# Patient Record
Sex: FEMALE | Race: WHITE | NOT HISPANIC OR LATINO | Employment: OTHER | URBAN - METROPOLITAN AREA
[De-identification: names, ages, dates, MRNs, and addresses within clinical notes are randomized per-mention and may not be internally consistent; named-entity substitution may affect disease eponyms.]

---

## 2022-06-13 ENCOUNTER — ANESTHESIA EVENT (OUTPATIENT)
Dept: PERIOP | Facility: HOSPITAL | Age: 77
End: 2022-06-13
Payer: MEDICARE

## 2022-06-13 RX ORDER — OLMESARTAN MEDOXOMIL 40 MG/1
40 TABLET ORAL DAILY
COMMUNITY

## 2022-06-13 RX ORDER — SULFAMETHOXAZOLE AND TRIMETHOPRIM 400; 80 MG/1; MG/1
1 TABLET ORAL DAILY
COMMUNITY
Start: 2022-04-26 | End: 2022-06-21

## 2022-06-13 RX ORDER — ROSUVASTATIN CALCIUM 5 MG/1
5 TABLET, COATED ORAL DAILY
COMMUNITY

## 2022-06-13 RX ORDER — FENOFIBRATE 54 MG/1
54 TABLET ORAL DAILY
COMMUNITY

## 2022-06-13 RX ORDER — METOPROLOL SUCCINATE 50 MG/1
50 TABLET, EXTENDED RELEASE ORAL DAILY
COMMUNITY
Start: 2022-05-02

## 2022-06-13 RX ORDER — DILTIAZEM HYDROCHLORIDE 240 MG/1
240 CAPSULE, EXTENDED RELEASE ORAL DAILY
COMMUNITY

## 2022-06-13 RX ORDER — CLONAZEPAM 0.12 MG/1
TABLET, ORALLY DISINTEGRATING ORAL
COMMUNITY
Start: 2022-04-08 | End: 2022-06-13

## 2022-06-13 RX ORDER — DABIGATRAN ETEXILATE 150 MG/1
150 CAPSULE, COATED PELLETS ORAL 2 TIMES DAILY
COMMUNITY

## 2022-06-13 RX ORDER — CONJUGATED ESTROGENS 0.62 MG/G
CREAM VAGINAL
COMMUNITY
Start: 2022-04-07

## 2022-06-13 NOTE — PRE-PROCEDURE INSTRUCTIONS
Pre-Surgery Instructions:   Medication Instructions    dabigatran etexilate (PRADAXA) 150 mg capsu Instructions provided by [facility] 48 hours prior surgery    diltiazem (DILACOR XR) 240 MG 24 hr capsule Take day of surgery   fenofibrate (TRICOR) 54 MG tablet Take day of surgery   metoprolol succinate (TOPROL-XL) 50 mg 24 hr tablet Take day of surgery   olmesartan (BENICAR) 40 mg tablet Hold day of surgery   Omega-3 Fatty Acids (FISH OIL PO) Stop taking 7 days prior to surgery   rosuvastatin (CRESTOR) 5 mg tablet Take night before surgery    VITAMIN D PO Stop taking 7 days prior to surgery  Have you had / have a sore throat? No  Have you had / have a cough less than 1 week? No  Have you had / have a fever greater than 100 0 - 100  4? No  Are you experiencing any shortness of breath? No    Review with patient via phone medications and showering instructions  Instructed to avoid all ASA and OTC Vit/Supp 1 week prior to surgery and to avoid NSAIDs 3 days prior to surgery per anesthesia instructions  Tylenol ok to take prn  [facility] call with surgery schedule time, nothing eat or drink after midnight  Verbalized understanding

## 2022-06-21 ENCOUNTER — HOSPITAL ENCOUNTER (OUTPATIENT)
Facility: HOSPITAL | Age: 77
Setting detail: OUTPATIENT SURGERY
Discharge: HOME | End: 2022-06-21
Attending: OBSTETRICS & GYNECOLOGY | Admitting: OBSTETRICS & GYNECOLOGY
Payer: MEDICARE

## 2022-06-21 ENCOUNTER — ANESTHESIA (OUTPATIENT)
Dept: PERIOP | Facility: HOSPITAL | Age: 77
End: 2022-06-21
Payer: MEDICARE

## 2022-06-21 VITALS
HEIGHT: 66 IN | BODY MASS INDEX: 27.21 KG/M2 | TEMPERATURE: 97.6 F | SYSTOLIC BLOOD PRESSURE: 176 MMHG | WEIGHT: 169.31 LBS | DIASTOLIC BLOOD PRESSURE: 90 MMHG | HEART RATE: 60 BPM | OXYGEN SATURATION: 95 % | RESPIRATION RATE: 16 BRPM

## 2022-06-21 DIAGNOSIS — N81.89 OTHER FEMALE GENITAL PROLAPSE: ICD-10-CM

## 2022-06-21 DIAGNOSIS — N36.41 HYPERMOBILITY OF URETHRA: ICD-10-CM

## 2022-06-21 DIAGNOSIS — N81.6 RECTOCELE: ICD-10-CM

## 2022-06-21 DIAGNOSIS — N81.11 CYSTOCELE, MIDLINE: ICD-10-CM

## 2022-06-21 DIAGNOSIS — N81.2 INCOMPLETE UTEROVAGINAL PROLAPSE: ICD-10-CM

## 2022-06-21 DIAGNOSIS — N39.3 SUI (STRESS URINARY INCONTINENCE, FEMALE): Primary | ICD-10-CM

## 2022-06-21 DIAGNOSIS — N81.84 PELVIC MUSCLE WASTING: ICD-10-CM

## 2022-06-21 PROCEDURE — 87086 URINE CULTURE/COLONY COUNT: CPT | Performed by: OBSTETRICS & GYNECOLOGY

## 2022-06-21 PROCEDURE — 87186 SC STD MICRODIL/AGAR DIL: CPT | Performed by: OBSTETRICS & GYNECOLOGY

## 2022-06-21 PROCEDURE — C1771 REP DEV, URINARY, W/SLING: HCPCS | Performed by: OBSTETRICS & GYNECOLOGY

## 2022-06-21 DEVICE — SINGLE INCISION SLING SYSTEM
Type: IMPLANTABLE DEVICE | Site: VAGINA | Status: FUNCTIONAL
Brand: ALTIS

## 2022-06-21 DEVICE — THE NEUGUIDE™ IS A SINGLE USE TRANS-VAGINAL DEVICE FOR THE REPAIR OF PELVIC ORGAN PROLAPSE (POP) BY ANCHORING SUTURES TO LIGAMENTS OF THE PELVIC FLOOR.
Type: IMPLANTABLE DEVICE | Site: VAGINA | Status: FUNCTIONAL
Brand: NEUGUIDE™

## 2022-06-21 RX ORDER — FENTANYL CITRATE/PF 50 MCG/ML
25 SYRINGE (ML) INJECTION
Status: DISCONTINUED | OUTPATIENT
Start: 2022-06-21 | End: 2022-06-21 | Stop reason: HOSPADM

## 2022-06-21 RX ORDER — SULFAMETHOXAZOLE AND TRIMETHOPRIM 800; 160 MG/1; MG/1
1 TABLET ORAL EVERY 12 HOURS SCHEDULED
Qty: 6 TABLET | Refills: 0 | Status: SHIPPED | OUTPATIENT
Start: 2022-06-21 | End: 2022-06-22 | Stop reason: SDUPTHER

## 2022-06-21 RX ORDER — PROPOFOL 10 MG/ML
INJECTION, EMULSION INTRAVENOUS AS NEEDED
Status: DISCONTINUED | OUTPATIENT
Start: 2022-06-21 | End: 2022-06-21

## 2022-06-21 RX ORDER — ACETAMINOPHEN 325 MG/1
975 TABLET ORAL EVERY 6 HOURS SCHEDULED
Status: DISCONTINUED | OUTPATIENT
Start: 2022-06-21 | End: 2022-06-21 | Stop reason: HOSPADM

## 2022-06-21 RX ORDER — METOPROLOL TARTRATE 5 MG/5ML
2.5 INJECTION INTRAVENOUS ONCE
Status: COMPLETED | OUTPATIENT
Start: 2022-06-21 | End: 2022-06-21

## 2022-06-21 RX ORDER — ACETAMINOPHEN 325 MG/1
975 TABLET ORAL ONCE
Status: COMPLETED | OUTPATIENT
Start: 2022-06-21 | End: 2022-06-21

## 2022-06-21 RX ORDER — SENNOSIDES 8.6 MG
650 CAPSULE ORAL EVERY 6 HOURS PRN
Qty: 30 TABLET | Refills: 0
Start: 2022-06-21

## 2022-06-21 RX ORDER — OXYCODONE HYDROCHLORIDE 5 MG/1
5 TABLET ORAL EVERY 4 HOURS PRN
Status: DISCONTINUED | OUTPATIENT
Start: 2022-06-21 | End: 2022-06-21 | Stop reason: HOSPADM

## 2022-06-21 RX ORDER — MAGNESIUM HYDROXIDE 1200 MG/15ML
LIQUID ORAL AS NEEDED
Status: DISCONTINUED | OUTPATIENT
Start: 2022-06-21 | End: 2022-06-21 | Stop reason: HOSPADM

## 2022-06-21 RX ORDER — FUROSEMIDE 10 MG/ML
INJECTION INTRAMUSCULAR; INTRAVENOUS AS NEEDED
Status: DISCONTINUED | OUTPATIENT
Start: 2022-06-21 | End: 2022-06-21

## 2022-06-21 RX ORDER — DEXAMETHASONE SODIUM PHOSPHATE 4 MG/ML
INJECTION, SOLUTION INTRA-ARTICULAR; INTRALESIONAL; INTRAMUSCULAR; INTRAVENOUS; SOFT TISSUE AS NEEDED
Status: DISCONTINUED | OUTPATIENT
Start: 2022-06-21 | End: 2022-06-21

## 2022-06-21 RX ORDER — ACETAMINOPHEN 325 MG/1
975 TABLET ORAL EVERY 6 HOURS SCHEDULED
Status: DISCONTINUED | OUTPATIENT
Start: 2022-06-21 | End: 2022-06-21

## 2022-06-21 RX ORDER — CEFAZOLIN SODIUM 2 G/50ML
2000 SOLUTION INTRAVENOUS ONCE
Status: DISCONTINUED | OUTPATIENT
Start: 2022-06-21 | End: 2022-06-21 | Stop reason: HOSPADM

## 2022-06-21 RX ORDER — GABAPENTIN 400 MG/1
400 CAPSULE ORAL ONCE
Status: COMPLETED | OUTPATIENT
Start: 2022-06-21 | End: 2022-06-21

## 2022-06-21 RX ORDER — HEPARIN SODIUM 5000 [USP'U]/ML
5000 INJECTION, SOLUTION INTRAVENOUS; SUBCUTANEOUS ONCE
Status: COMPLETED | OUTPATIENT
Start: 2022-06-21 | End: 2022-06-21

## 2022-06-21 RX ORDER — SODIUM CHLORIDE 9 MG/ML
125 INJECTION, SOLUTION INTRAVENOUS CONTINUOUS
Status: DISCONTINUED | OUTPATIENT
Start: 2022-06-21 | End: 2022-06-21 | Stop reason: HOSPADM

## 2022-06-21 RX ORDER — KETAMINE HYDROCHLORIDE 50 MG/ML
INJECTION, SOLUTION, CONCENTRATE INTRAMUSCULAR; INTRAVENOUS AS NEEDED
Status: DISCONTINUED | OUTPATIENT
Start: 2022-06-21 | End: 2022-06-21

## 2022-06-21 RX ORDER — ONDANSETRON 2 MG/ML
4 INJECTION INTRAMUSCULAR; INTRAVENOUS EVERY 6 HOURS PRN
Status: DISCONTINUED | OUTPATIENT
Start: 2022-06-21 | End: 2022-06-21 | Stop reason: HOSPADM

## 2022-06-21 RX ORDER — ONDANSETRON 2 MG/ML
INJECTION INTRAMUSCULAR; INTRAVENOUS AS NEEDED
Status: DISCONTINUED | OUTPATIENT
Start: 2022-06-21 | End: 2022-06-21

## 2022-06-21 RX ORDER — MIDAZOLAM HYDROCHLORIDE 2 MG/2ML
INJECTION, SOLUTION INTRAMUSCULAR; INTRAVENOUS AS NEEDED
Status: DISCONTINUED | OUTPATIENT
Start: 2022-06-21 | End: 2022-06-21

## 2022-06-21 RX ORDER — FENTANYL CITRATE 50 UG/ML
INJECTION, SOLUTION INTRAMUSCULAR; INTRAVENOUS AS NEEDED
Status: DISCONTINUED | OUTPATIENT
Start: 2022-06-21 | End: 2022-06-21

## 2022-06-21 RX ORDER — ONDANSETRON 2 MG/ML
4 INJECTION INTRAMUSCULAR; INTRAVENOUS ONCE AS NEEDED
Status: DISCONTINUED | OUTPATIENT
Start: 2022-06-21 | End: 2022-06-21 | Stop reason: HOSPADM

## 2022-06-21 RX ORDER — CEFAZOLIN SODIUM 2 G/50ML
SOLUTION INTRAVENOUS AS NEEDED
Status: DISCONTINUED | OUTPATIENT
Start: 2022-06-21 | End: 2022-06-21

## 2022-06-21 RX ORDER — DOCUSATE SODIUM 100 MG/1
100 CAPSULE, LIQUID FILLED ORAL 2 TIMES DAILY
Refills: 0
Start: 2022-06-21

## 2022-06-21 RX ORDER — PROPOFOL 10 MG/ML
INJECTION, EMULSION INTRAVENOUS CONTINUOUS PRN
Status: DISCONTINUED | OUTPATIENT
Start: 2022-06-21 | End: 2022-06-21

## 2022-06-21 RX ADMIN — ONDANSETRON 4 MG: 2 INJECTION INTRAMUSCULAR; INTRAVENOUS at 13:23

## 2022-06-21 RX ADMIN — MIDAZOLAM 2 MG: 1 INJECTION INTRAMUSCULAR; INTRAVENOUS at 12:35

## 2022-06-21 RX ADMIN — ACETAMINOPHEN 325MG 975 MG: 325 TABLET ORAL at 11:13

## 2022-06-21 RX ADMIN — SODIUM CHLORIDE 125 ML/HR: 0.9 INJECTION, SOLUTION INTRAVENOUS at 11:23

## 2022-06-21 RX ADMIN — PROPOFOL 50 MCG/KG/MIN: 10 INJECTION, EMULSION INTRAVENOUS at 12:43

## 2022-06-21 RX ADMIN — METOROPROLOL TARTRATE 2.5 MG: 5 INJECTION, SOLUTION INTRAVENOUS at 11:21

## 2022-06-21 RX ADMIN — GABAPENTIN 400 MG: 400 CAPSULE ORAL at 11:13

## 2022-06-21 RX ADMIN — DEXAMETHASONE SODIUM PHOSPHATE 4 MG: 4 INJECTION INTRA-ARTICULAR; INTRALESIONAL; INTRAMUSCULAR; INTRAVENOUS; SOFT TISSUE at 13:23

## 2022-06-21 RX ADMIN — HEPARIN SODIUM 5000 UNITS: 5000 INJECTION INTRAVENOUS; SUBCUTANEOUS at 12:03

## 2022-06-21 RX ADMIN — FUROSEMIDE 10 MG: 10 INJECTION, SOLUTION INTRAVENOUS at 14:19

## 2022-06-21 RX ADMIN — FENTANYL CITRATE 50 MCG: 50 INJECTION INTRAMUSCULAR; INTRAVENOUS at 13:12

## 2022-06-21 RX ADMIN — FENTANYL CITRATE 50 MCG: 50 INJECTION INTRAMUSCULAR; INTRAVENOUS at 12:53

## 2022-06-21 RX ADMIN — CEFAZOLIN SODIUM 2000 MG: 2 SOLUTION INTRAVENOUS at 12:40

## 2022-06-21 RX ADMIN — PROPOFOL 50 MG: 10 INJECTION, EMULSION INTRAVENOUS at 13:12

## 2022-06-21 RX ADMIN — KETAMINE HYDROCHLORIDE 20 MG: 50 INJECTION INTRAMUSCULAR; INTRAVENOUS at 12:43

## 2022-06-21 NOTE — ANESTHESIA POSTPROCEDURE EVALUATION
Post-Op Assessment Note    CV Status:  Stable    Pain management: adequate     Mental Status:  Alert and awake   Hydration Status:  Euvolemic   PONV Controlled:  Controlled   Airway Patency:  Patent      Post Op Vitals Reviewed: Yes      Staff: Anesthesiologist         No complications documented      /88 (06/21/22 1531)    Temp      Pulse 60 (06/21/22 1531)   Resp 12 (06/21/22 1531)    SpO2 96 % (06/21/22 1531)

## 2022-06-21 NOTE — DISCHARGE INSTRUCTIONS
Apical Vaginal Repair (EnPlace System)    ** YOU HAVE VAGINAL PACKING THAT YOU WILL NEED TO REMOVE Friday MORNING   WHAT YOU NEED TO KNOW:   An apical vaginal repair is a procedure to lift the cervix, vagina  and surrounding structures to the normal anatomical position  This can help prevent you from having a bulge sensation in the vagina  The procedure is also called an EnPlace procedure  DISCHARGE INSTRUCTIONS:   Medicines:   Pain medicine: You may need medicine to take away or decrease pain  Learn how to take your medicine  Ask what medicine and how much you should take  Be sure you know how, when, and how often to take it  Do not wait until the pain is severe before you take your medicine  Tell caregivers if your pain does not decrease  Pain medicine can make you dizzy or sleepy  Prevent falls by calling someone when you get out of bed or if you need help  Antibiotics: This medicine is given to fight or prevent an infection caused by bacteria  Always take your antibiotics exactly as ordered by your healthcare provider  Do not stop taking your medicine unless directed by your healthcare provider  Never save antibiotics or take leftover antibiotics that were given to you for another illness  Take your medicine as directed  Contact your healthcare provider if you think your medicine is not helping or if you have side effects  Tell him or her if you are allergic to any medicine  Keep a list of the medicines, vitamins, and herbs you take  Include the amounts, and when and why you take them  Bring the list or the pill bottles to follow-up visits  Carry your medicine list with you in case of an emergency  Follow up with your healthcare provider as directed:  Write down your questions so you remember to ask them during your visits  Self-care:   Sex:  Do not have sex until your healthcare provider says it is okay  Kegel exercises:   To do kegel exercises, squeeze your pelvic floor muscles for 5 to 10 seconds, then release  Regular kegel exercises will help your pelvic floor muscles become stronger  This will help prevent you from leaking urine  Ask your healthcare provider when to start these exercises and how often to do them  Sanitary pad:  Change your sanitary pad regularly  Keep track of how often you change the pad  Story catheter:  Keep the bag below your waist  This will help prevent infection and other problems caused by urine flowing back into your bladder  Do not pull on the catheter because this can cause pain and bleeding, and the catheter could come out  Keep the catheter tubing free of kinks so your urine will flow into the bag  Your healthcare provider will remove the catheter as soon as possible, to help prevent infection  Wound care:  When you are allowed to bathe or shower, carefully wash your vaginal area with soap and water  Do not put pressure on your abdomen: This will help prevent damage to your surgery area  Do not strain, lift heavy objects, or stand for a very long time  Do not perform strenuous exercises, such as running and weight lifting  Activity:  You may need to start walking within a few days after your procedure  Ask your healthcare provider when to start and how long you should walk  Ask about any other exercises that may be right for you  Support socks: You may need to wear support socks  These are tight socks that help increase the circulation in your legs until you are more active  This helps prevent blood clots  Contact your healthcare provider if:   You soak a sanitary pad with blood every hour for 4 hours  You have vaginal pain that does not go away even after you take pain medicine  You have pus or a foul-smelling discharge from your genital area  You see blood in your urine  You have pain during sex  You have a fever, chills, a cough, or feel weak and achy  You have nausea and vomiting      You have questions or concerns about your condition or care  Seek care immediately or call 911 if: You feel something is bulging out into your vagina or rectum and not going back in  You cannot urinate  Your arm or leg feels warm, tender, and painful  It may look swollen and red  You suddenly feel lightheaded and short of breath  You have chest pain  You may have more pain when you take a deep breath or cough  You may cough up blood  © 2017 [facility] Information is for End User's use only and may not be sold, redistributed or otherwise used for commercial purposes  All illustrations and images included in CareNotes® are the copyrighted property of A D A M [employer] Inc  or Hadley Josafat  The above information is an  only  It is not intended as medical advice for individual conditions or treatments  Talk to your doctor, nurse or pharmacist before following any medical regimen to see if it is safe and effective for you  Urethral Sling Procedure   WHAT YOU NEED TO KNOW:   A bladder sling procedure is surgery to treat urinary incontinence in women  The sling acts as a hammock to keep your urethra in place and hold it closed when your bladder is full  You may have vaginal bleeding or discharge for up to a week after your surgery  Use sanitary pads  Do not use tampons  You may have some pelvic discomfort or trouble urinating  DISCHARGE INSTRUCTIONS:   Call 911 for any of the following: You have sudden trouble breathing  Seek care immediately if:   Your bleeding gets worse  You have yellow or foul smelling discharge from your vagina  You cannot urinate, or you are urinating less than what is normal for you  You feel confused  Contact your healthcare provider if:   You have a fever  You do not feel like you are able to empty your bladder completely when you urinate  You feel the need to urinate very suddenly  You have burning or stinging when you urinate      You have blood in your urine  Your skin is itchy, swollen, or you have a rash  You have questions or concerns about your condition or care  Medicines:   Prescription pain medicine  may be given  Ask your how to take this medicine safely  Take your medicine as directed  Contact your healthcare provider if you think your medicine is not helping or if you have side effects  Tell him or her if you are allergic to any medicine  Keep a list of the medicines, vitamins, and herbs you take  Include the amounts, and when and why you take them  Bring the list or the pill bottles to follow-up visits  Carry your medicine list with you in case of an emergency  Self-catheterization:  You may need to put a catheter into your bladder after you urinate to empty any remaining urine  A catheter is a small rubber tube used to drain urine  Healthcare providers will teach you how to put the catheter in safely  This may be needed until you are completely emptying your bladder  Story catheter: You may have a Story catheter for a short period of time  The Story is a tube put into your bladder to drain urine into a bag  Keep the bag below your waist  This will prevent urine from flowing back into your bladder and causing an infection or other problems  Also, keep the tube free of kinks so the urine will drain properly  Do not pull on the catheter  This can cause pain and bleeding, and may cause the catheter to come out  Activity:  Do not lift heavy objects for 6 weeks after your procedure  Do not have intercourse for 4 to 6 weeks  Do not use a tampon for 4 weeks  Ask your healthcare provider when you can return to work or your usual activities  Do pelvic muscle exercises: These are also called Kegel exercises  These exercises help strengthen your pelvic muscles and help prevent urine leakage  Tighten the muscles of your pelvis and hold them tight for 5 seconds, then relax for 5 seconds   Gradually work up to tightening them for 10 seconds and relaxing for 10 seconds  Do this 3 times each day  Keep a record:  Keep a record of when you urinate and if you leak any urine  Write down what you were doing when you leaked urine, such as coughing or sneezing  Bring the log to your follow-up visits  Prevent constipation:  Drink liquids as directed  You may need to drink more water than usual to soften your bowel movements  Eat a variety of healthy foods, especially fruit and foods high in fiber  You may need to use an over-the-counter bowel movement softener  Follow up with your healthcare provider as directed: You may need a test to check how much urine remains in your bladder after you urinate  This will help show how the sling is working  Write down your questions so you remember to ask them during your visits  © 2017 [facility] [employer] Information is for End User's use only and may not be sold, redistributed or otherwise used for commercial purposes  All illustrations and images included in CareNotes® are the copyrighted property of A D A M , Inc  or Hadley Maurice  The above information is an  only  It is not intended as medical advice for individual conditions or treatments  Talk to your doctor, nurse or pharmacist before following any medical regimen to see if it is safe and effective for you

## 2022-06-21 NOTE — ANESTHESIA PREPROCEDURE EVALUATION
Procedure:  COLPOSUSPENSION VAGINAL EXTRAPERITONEAL(ENPLACE) (N/A Vagina )  A&P COLPORRHAPHY (N/A Perineum)  PUBOVAGINAL SLING (N/A Vagina )  CYSTOSCOPY (N/A Bladder)    Relevant Problems   ANESTHESIA (within normal limits)      CARDIO   (+) Atrial fibrillation (HCC)   (+) Hypertension   (+) Pacemaker      ENDO (within normal limits)      GI/HEPATIC (within normal limits)      HEMATOLOGY   (+) Coagulation disorder (HCC) (pradaxa held for procedure)        Physical Exam    Airway    Mallampati score: I  TM Distance: >3 FB  Neck ROM: full     Dental   Comment: Front upper veneers,     Cardiovascular  Cardiovascular exam normal    Pulmonary  Pulmonary exam normal     Other Findings        Anesthesia Plan  ASA Score- 3     Anesthesia Type- IV sedation with anesthesia with ASA Monitors  Additional Monitors:   Airway Plan:     Comment: Cardiology clearance noted  Did not take beta blocker at home, will order metoprolol 2 5 mg IV now  Plan Factors-    Chart reviewed  Patient summary reviewed  Induction- intravenous  Postoperative Plan-     Informed Consent- Anesthetic plan and risks discussed with patient

## 2022-06-21 NOTE — INTERVAL H&P NOTE
H&P reviewed  After examining the patient I find no changes in the patients condition since the H&P had been written  Last Pradaxa dose was Sunday  Did not take Metoprolol this am and received IV here for that  Post op instructions reviewed       Vitals:    06/21/22 1152   BP: 154/86   Pulse:    Resp:    Temp:    SpO2:        Rai Tinajero MD  [facility] [address] [place] [address] Female Pelvic Medicine

## 2022-06-21 NOTE — OP NOTE
OPERATIVE REPORT  PATIENT NAME: Elisha Banda    :  1945  MRN: [id number]  Pt Location: [facility] OR ROOM 04    SURGERY DATE: 2022    Surgeon(s) and Role: * Myriam Wakefield MD - Primary     * Israel Dumont MD - Resident, observing      Gilbert Monroe MD - Fellow, first assisting     * Lita Rogers MS4 - Observing    Preop Diagnosis:  Incomplete uterovaginal prolapse [N81 2]  Cystocele, midline [N81 11]  Rectocele [N81 6]  Pelvic muscle wasting [N81 84]  Other female genital prolapse [N81 89]  Hypermobility of urethra [N36 41]  Stress urinary incontinence     Post-Op Diagnosis Codes:     * Incomplete uterovaginal prolapse [N81 2]     * Cystocele, midline [N81 11]     * Rectocele [N81 6]     * Pelvic muscle wasting [N81 84]     * Other female genital prolapse [N81 89]     * Hypermobility of urethra [N36 41]     * Stress urinary incontinence     Procedure(s) (LRB):  COLPOSUSPENSION VAGINAL EXTRAPERITONEAL(ENPLACE) (N/A)  A&P COLPORRHAPHY (N/A)  PUBOVAGINAL SLING (N/A)  CYSTOSCOPY (N/A)    Specimen(s):  ID Type Source Tests Collected by Time Destination   A : urine culture Urine Urine, Catheter URINE CULTURE Myriam Wakefield MD 2022 1325      Estimated Blood Loss:   25 mL    Drains:  * No LDAs found *    Anesthesia Type:   IV Sedation with Anesthesia    Operative Indications:  Incomplete uterovaginal prolapse [N81 2]  Cystocele, midline [N81 11]  Rectocele [N81 6]  Pelvic muscle wasting [N81 84]  Other female genital prolapse [N81 89]  Hypermobility of urethra [N36 41]  Stress urinary incontinence     Operative Findings:  Stage 3 uterovaginal prolapse, stage 3 cystocele, stage 3 rectocele, enlarged genital hiatus  Cervix grossly normal  Severe vulvovaginal atrophy  On initial urine drainage, foul odor appreciated although urine appears clear  Cystoscopy: efflux noted from bilateral ureteral orifices  No mesh, suture material, or injury noted to bladder lumen   Moderate trabeculations and small diverticulae noted  Small polypoidal growth noted b/l just medial to ureteral orifices left larger than right  Complications:   None    Procedure and Technique:  Appropriate preoperative antibiotics chosen per ACOG guidelines were given  Bilateral SCDs were placed in the lower extremities for DVT prevention prior to the institution of anesthesia  The patient was identified in the holding area by the operating room staff and attending physician  She was taken to the operating room where anesthesia was instituted without complications  She was placed in the dorsal lithotomy position with the legs in [address] [place] [address] with care taken to avoid excessive flexion or extension of her lower extremities  The patient was prepped and draped in the usual sterile fashion  A Story catheter was inserted  A finger was placed in the lateral vagina, with careful palpation of the ischial spines and sacrospinous ligaments bilaterally  The right finger sleeve was then applied to the surgeon's index finger  These landmarks were then again palpated with the finger sleeve in place  Location along the sacrospinous ligament approximately in the middle 1/3 of the ligament as well as the lower 1/3 of the ligament was carefully palpated, and the trocar device loaded with the trocar device loaded with a 7 inch 22G spinal needle to inject the trocar site initially with Marcaine/Lidocaine/Epinephrine local totaling 1 5 cc injection  The trocar with 0 Prolene suture at this location while the surgeons finger was firmly pressed against the ligament, approximately 2 cm medial to the ischial spine using the sacrum as a guide  Once the suture was anchored in place, which was confirmed by tenting of the ligament with gentle tugging, all instruments were removed from the vagina  Rectal exam confirmed proper location as well  She had the exact same procedure performed on the contralateral side   Next local was injected into the cervico-vaginal junction and a 3 cm anterior vaginal epithelial incision was made along the anterior cervico-vaginal junction, until the cervical stroma was encountered  Next, one strand of the Prolene suture was passed backwards using a large white needle through its entering point in the vaginal wall and medially through the cervical stromal tissue  The second strand on the same side was passed caudally and proximal to the cervical os using the same technique  The exact same procedure was performed on the contralateral side  Attention was turned to the anterior wall where a persistent cystocele was noted  Two Allis clamps were applied horizontally along the vaginal incision to grasp the dependent portion of the cystocele for traction  The epithelium was further  from the vaginal muscularis sharply with tenotomy and metzenbaum scissors and bluntly with peanut sponges and a raytec  Excess vaginal mucosal skin was trimmed  The vaginal wall was then plicated in a vertical imbricating fashion using 2-0 PDS x 2 sutures across the width of the cervicovaginal area  We then began closure of the anterior vaginal epithelial incision with a 2-0 Vicryl sutures in a running locked stitch from lateral to medial with two separate sutures from each vaginal apex  The incision was intentionally left open after the first few throws from each side  The EnPlace prolene sutures were then tied down separately, one at a time until proper apical support was obtained and then they were tied together pushing the prolapse up to the anatomic position of the vaginal apex  The 2-0 PDS stitches were then tied down  We then finished closure of the anterior vaginal epithelial incision with the two 2-0 Vicryl sutures in a running locked stitch from lateral to medial  The incision was inspected and noted to be hemostatic  We turned our attention for performance of the single incision sling   At this time, the mid urethral zone was identified in reference to the Story catheter and urethral meatus and local anesthetic solution was injected into the anterior vaginal wall at the mid urethral level for hydrodissection and vasoconstriction  Next, local was injected at the midline and to the left and right of midline directing the infiltration laterally towards the cephalad aspect of the inferior pubic ramus bilaterally  Care was taken to ensure that the sulcus was flattened and free of infiltration to minimize chance for buttonholing or tapping too close to the anterolateral sulcus  After completion of hydrodissection, 2 Allis clamps were used to grasp the anterior vaginal wall for traction  A 1 5 cm incision was made to the midline  Two Allis clamps were then placed on each cut edge of the incision for stabilization  Tenotomy scissors were used to create a small vaginal tunnel with sharp and blunt dissection above the anterior vaginal wall directed laterally towards the cephalad aspect of the inferior pubic ramus bilaterally  Dissection was carried out to the edge of the bone itself but no dissection into the obturator internus muscle  Once the dissection was complete, the sling was placed  The Altis system was used  An index finger was placed in the vagina for guidance  The Altis trocar was placed into the pre-dissected tract  The handle was held in horizontal slight upward canting to avoid buttonholing of the sulcus  Cephalad drift was used to allow passage around the inferior pubic ramus  A thumb was placed on the heel of the introducer to allow a push/pivot maneuver to place a fixed anchor into the obturator internus muscle membrane complex on the patient's left side  Proper handle deviation confirmed proper anchor placement, as well as a gentle tugging on the sling  Once the introducer was removed, it also confirmed proper anchor placement  The exact same sequence of steps was repeated on the patient's right side with adjustable anchor   Once it was complete, the introducer was removed and gentle tugging again confirmed proper anchor placement  The Story catheter was then used to drain the bladder and then it was removed and a diagnostic cystoscopy was performed by instilling 300 mL of fluid into the bladder  Both ureters were effluxing normally and there were no lacerations or injury in the lower urinary tract  We used Crede maneuver to elicit loss of fluid from the urethral meatus and there was loss of fluid noted  The tensioning suture was used to adjust the tape until there was minimal to no leakage with Crede  After appropriate tensioning, the tensioning suture was cut and the vaginal incision was closed with 2-0 Vicryl suture in a running locked stitch  At this time, we placed the Story back in the bladder  Attention was then turned to the posterior compartment where 2 Allis clamps were placed in the posterior fourchette over the mucocutaneous border to reduce the markedly relaxed vaginal outlet  Dilute Marcaine solution was injected into the perineum  A nathaniel-shaped incision was made extending from the vaginal mucosa onto the perineum  The overlying skin was removed en bloc  We then began closure of the posterior colporrhaphy with a 2-0 Vicryl suture in a running locked stitch  We reapproximated the perineal body with a 0-Vicryl interrupted sutures x 2  We closed the remainder of the colporrhaphy to the level of the hymen  We closed the perineal skin with 2-0 Vicryl in a subcuticular fashion  No bladder, ureteral, viscus, or solid organ injury were noted at the end of the procedure  Irrigation was performed  We completed the procedure  Vaginal packing was placed in the vagina  There were no complications  The sponge, needle and instrument count were correct x2  The patient tolerated the procedure well and went to the recovery room in stable condition and she is stable at the moment of this dictation   A urine culture was sent and a short course of Bactrim was sent to patient's pharmacy  Dr Luisa Larry was present for the entire procedure      Patient Disposition:  PACU       SIGNATURE: Ariela Nieves MD  DATE: June 21, 2022  TIME: 2:56 PM

## 2022-06-22 RX ORDER — SULFAMETHOXAZOLE AND TRIMETHOPRIM 800; 160 MG/1; MG/1
1 TABLET ORAL EVERY 12 HOURS SCHEDULED
Qty: 6 TABLET | Refills: 0 | Status: SHIPPED | OUTPATIENT
Start: 2022-06-22 | End: 2022-06-25

## 2022-06-23 LAB — BACTERIA UR CULT: ABNORMAL

## 2022-07-15 ENCOUNTER — TELEPHONE (OUTPATIENT)
Dept: UROLOGY | Facility: CLINIC | Age: 77
End: 2022-07-15

## 2022-07-15 NOTE — TELEPHONE ENCOUNTER
Patient called back to schedule an appt  With Dr Joseline Rasmussen per Joseph Dueñas RN voice mail  (New Patient, 30 mins) Pls return the patients call to schedule her  (She a bit anxious, she doesn't know why she needs this appt   Can you call ASAP)

## 2022-07-15 NOTE — TELEPHONE ENCOUNTER
Called the patient and left another voicemail asking to call the office on Monday to discuss reason for appointment    Holding 8/8/2022 at 1130 with Dr Jay Kilpatrick at the [facility] office

## 2022-07-15 NOTE — TELEPHONE ENCOUNTER
----- Message from Kieran Fernandes MD sent at 7/15/2022  7:13 AM EDT -----  Regarding: RE: patient referral  Thank you Dr Katelynn Blackwood for reaching out, I reviewed the images  Team, can we help arrange this patient, Ms Benny Dickinson, for new patient appt with cystoscopy (30mins) to evaluate bladder lesion? Please let her know that the cystoscopy is part of the visit and explain the procedure  Thanks so much,  Balwinder Tom      ----- Message -----  From: Wilder Win MD  Sent: 7/14/2022   3:11 PM EDT  To: Kieran Fernandes MD  Subject: patient referral                                 Barry Andreadeborah Perla all is well  We operated on this patient a few weeks ago with Dr Miri Truong and wanted to refer her to you for this bladder lesion we saw during her cysto near one of her Uo's  The images should be scanned in epic but in case not I will send to you as well  Let me know if any questions or need any other information      Thanks so much,    Wilder Win MD  [facility] [address] [place] [address] Female Pelvic Medicine

## 2022-07-15 NOTE — TELEPHONE ENCOUNTER
Called the patient and left a voicemail asking patient to please call the office to schedule an appointment for cystoscopy with Dr Logan Devries

## 2022-07-18 ENCOUNTER — TELEPHONE (OUTPATIENT)
Dept: OTHER | Facility: OTHER | Age: 77
End: 2022-07-18

## 2022-07-18 NOTE — TELEPHONE ENCOUNTER
Patient called back virginia regarding her referral to urologist and new patient appointment to schedule  Patient stated that she would be available to 1500 today to answer the phone

## 2022-07-18 NOTE — TELEPHONE ENCOUNTER
Called the patient who states " I have no idea why I am being referred to you "  " I just saw Dr Modesto Will last week and he told me everything was ok "  Explained to patient Dr Dale Robledo sent a message to Dr Logan Devries asking the office to schedule an appointment for a bladder lesion  Bladder lesion noted on her cystoscopy  Patient is very upset Dr Modesto Will did not mention anything to her about it    Patient wishes to speak with Dr Modesto Will before scheduling an appointment with Dr Logan Devries

## 2022-07-19 NOTE — TELEPHONE ENCOUNTER
Called the patient and left a voicemail asking patient to please call the office to review reason for referral to Urology

## 2022-07-20 NOTE — TELEPHONE ENCOUNTER
Called patient back  Patient started to reiterate  the entire story again how Urology called asking to schedule an appointment to see Dr Abdelrahman Moreno  "When I asked for what, I was told for a bladder lesion "  " I just left Dr Federico Malcolm office and he told me everything was fine "  Patient questioning why Dr Elissa Blanchard didn't remove the lesion and " now I have to go under anesthesia again "  Patient complaining of traveling to [facility] and all the tolls she has to pay  Encouraged patient to call Dr Elissa Blanchard and ask for a referral to an Urologist in [facility]  Patient told me" I am of little help to her "  " I could not answer any of her questions "  Patient hung up the phone

## 2022-07-21 NOTE — TELEPHONE ENCOUNTER
Called the patient who apologized for our last phone call  Patient continues to have frustration with Dr Wilfrido Bill  Patient states Dr Wilfrido Bill told her "it is nothing to worry about "  " I was on the OR table why didn't he remove it then "  Patient works full time and her  is in the hospital with CHF  Patient has appointment with Dr Wilfrido Bill on 8/10/2022   Patient wishes to think about things and discuss with her daughter and then will call Urology back

## 2022-08-31 ENCOUNTER — TELEPHONE (OUTPATIENT)
Dept: OTHER | Facility: OTHER | Age: 77
End: 2022-08-31

## 2022-08-31 NOTE — TELEPHONE ENCOUNTER
Pt  Called to set up a new patient appointment  She does have a referral and she would like a call back tomorrow at her work to set up an appointment  Preferably Oct  10th, if poss

## 2022-09-01 NOTE — TELEPHONE ENCOUNTER
Called patient and informed Dr Rob Lockhart not available on 10/10  She will check with daughter, review schedule and call back

## 2022-09-06 NOTE — TELEPHONE ENCOUNTER
Patient called  Patient is scheduled for a cysto With Dr Catie Fernandes on Now 8 @  8 am   Patient is aware of prep

## 2022-11-03 ENCOUNTER — PROCEDURE VISIT (OUTPATIENT)
Dept: UROLOGY | Facility: CLINIC | Age: 77
End: 2022-11-03

## 2022-11-03 VITALS
BODY MASS INDEX: 27.8 KG/M2 | SYSTOLIC BLOOD PRESSURE: 124 MMHG | HEART RATE: 68 BPM | HEIGHT: 66 IN | DIASTOLIC BLOOD PRESSURE: 70 MMHG | WEIGHT: 173 LBS

## 2022-11-03 DIAGNOSIS — D68.9 COAGULATION DISORDER (HCC): ICD-10-CM

## 2022-11-03 DIAGNOSIS — N39.3 SUI (STRESS URINARY INCONTINENCE, FEMALE): ICD-10-CM

## 2022-11-03 DIAGNOSIS — Q60.0 KIDNEY CONGENITALLY ABSENT, RIGHT: ICD-10-CM

## 2022-11-03 DIAGNOSIS — N32.9 LESION OF BLADDER: Primary | ICD-10-CM

## 2022-11-03 NOTE — PROGRESS NOTES
Cystoscopy     Date/Time 11/3/2022 8:48 AM     Performed by  Bibi Santiago MD     Authorized by Bibi Santiago MD      Universal Protocol:  Timeout called at: 11/3/2022 8:48 AM       Procedure Details:  Procedure type: cystoscopy    Patient tolerance: Patient tolerated the procedure well with no immediate complications    Additional Procedure Details:   Cystoscopy Procedure note    Risk and benefits of flexible cystoscopy were discussed  Informed consent was obtained  A urine dip is adequate for cystoscopy  The patient was placed into the modified supine position  Her genitalia was prepped with Betadine and draped in a sterile fashion  Viscous 2% lidocaine was instilled into the urethra and allowed dwell time for local anesthesia  Flexible cystoscopy was then performed with a 16F scope  Urethra was inspected on entrance and exit of the scope  Mild urethral caruncle noted  The bladder was thoroughly inspected in a 360 degree fashion  Both ureteral orifices were visualized in their orthotopic location, left ureteral orifice orthotopic, right ureteral orifice with area of abnormal heaped tissue medially consistent with prior operative pictures  Some hypervascularity noted  The bladder mucosa was thoroughly inspected  Retroflexion for evaluation of the bladder neck was normal       Overall this was a cystoscopy with a slight abnormality noted medial to the right ureteral orifice  Urine cytology sampled from the scope  A female office staff member was present throughout the entire procedure

## 2022-11-03 NOTE — LETTER
November 3, 2022     Moy Lentz, [address]   [address]    Patient: Alfonzo Garcia   YOB: 1945   Date of Visit: 11/3/2022       Dear Dr Anaya Mcpherson: Thank you for referring Sylwia Ross to me for evaluation  Below are my notes for this consultation  If you have questions, please do not hesitate to call me  I look forward to following your patient along with you  Sincerely,        Marylee Stare, MD        CC: Berton Musca, MD Angie Kells, MD Marylee Stare, MD  11/3/2022  8:49 AM  Sign when Signing Visit     Cystoscopy     Date/Time 11/3/2022 8:48 AM     Performed by  Marylee Stare, MD     Authorized by Marylee Stare, MD      Universal Protocol:  Timeout called at: 11/3/2022 8:48 AM       Procedure Details:  Procedure type: cystoscopy    Patient tolerance: Patient tolerated the procedure well with no immediate complications    Additional Procedure Details:   Cystoscopy Procedure note    Risk and benefits of flexible cystoscopy were discussed  Informed consent was obtained  A urine dip is adequate for cystoscopy  The patient was placed into the modified supine position  Her genitalia was prepped with Betadine and draped in a sterile fashion  Viscous 2% lidocaine was instilled into the urethra and allowed dwell time for local anesthesia  Flexible cystoscopy was then performed with a 16F scope  Urethra was inspected on entrance and exit of the scope  Mild urethral caruncle noted  The bladder was thoroughly inspected in a 360 degree fashion  Both ureteral orifices were visualized in their orthotopic location, left ureteral orifice orthotopic, right ureteral orifice with area of abnormal heaped tissue medially consistent with prior operative pictures  Some hypervascularity noted  The bladder mucosa was thoroughly inspected   Retroflexion for evaluation of the bladder neck was normal       Overall this was a cystoscopy with a slight abnormality noted medial to the right ureteral orifice  Urine cytology sampled from the scope  A female office staff member was present throughout the entire procedure  Rolan Troncoso MD  11/3/2022  8:48 AM  Sign when Signing Visit    UROLOGY NEW CONSULT NOTE     Joe Mehta is a 65 y o  female with a complaint of   Chief Complaint   Patient presents with   • Cystoscopy       History of Present Illness:     65 y o  female, known to Urogynecology (Dr Chelo Danielle)  Patient had a procedure end of June 2022 for stress incontinence at which time cystoscopy was performed by the team   Question of mucosal bladder abnormality was seen  Urology was contacted to arrange outpatient follow-up  Patient was contacted in July, there was some delay to scheduling at patient request  Patient now presents  In discussion, the patient has a 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2  She did follow with Nephrology for some time  Has not had any recent imaging of her left kidney  No gross hematuria  No history of bladder cancer  Remote smoking history now quit      Past Medical History:     Past Medical History:   Diagnosis Date   • Anxiety    • Atrial fibrillation ([address] )    • Colon polyp    • History of transfusion    • Hyperlipidemia    • Hypertension    • Irregular heart beat        PAST SURGICAL HISTORY:     Past Surgical History:   Procedure Laterality Date   • COLONOSCOPY     • CYSTOSCOPY  11/03/2022    [place]   • HAND SURGERY     • INSERT / REPLACE / REMOVE PACEMAKER     • KNEE ARTHROSCOPY Left    • OK CMBND ANTERPOST COLPORRAPHY W/CYSTO N/A 06/21/2022    Procedure: A&P COLPORRHAPHY;  Surgeon: Catrachito Altamirano MD;  [facility];  Service: UroGynecology          • OK CYSTOURETHROSCOPY N/A 06/21/2022    Procedure: Chago Wolff;  Surgeon: Catrachito Altamirano MD;  [facility];  Service: UroGynecology          • UT REVAGINAL PROLAPSE,SACROSP LIG N/A 06/21/2022    Procedure: COLPOSUSPENSION VAGINAL EXTRAPERITONEAL(ENPLACE); Surgeon: Jonny Sanford MD;  [facility];  Service: UroGynecology          • UT SLING OPER STRES INCONTINENCE N/A 06/21/2022    Procedure: PUBOVAGINAL SLING;  Surgeon: Jonny Sanford MD;  [facility];  Service: UroGynecology          • UMBILICAL HERNIA REPAIR      with mesh       CURRENT MEDICATIONS:     Current Outpatient Medications   Medication Sig Dispense Refill   • acetaminophen (TYLENOL) 650 mg CR tablet Take 1 tablet (650 mg total) by mouth every 6 (six) hours as needed for mild pain 30 tablet 0   • dabigatran etexilate (PRADAXA) 150 mg capsu Take 150 mg by mouth 2 (two) times a day     • diltiazem (DILACOR XR) 240 MG 24 hr capsule Take 240 mg by mouth daily     • docusate sodium (COLACE) 100 mg capsule Take 1 capsule (100 mg total) by mouth 2 (two) times a day  0   • fenofibrate (TRICOR) 54 MG tablet Take 54 mg by mouth daily     • metoprolol succinate (TOPROL-XL) 50 mg 24 hr tablet Take 50 mg by mouth daily     • olmesartan (BENICAR) 40 mg tablet Take 40 mg by mouth daily     • Omega-3 Fatty Acids (FISH OIL PO) Take by mouth daily after breakfast     • Premarin vaginal cream      • rosuvastatin (CRESTOR) 5 mg tablet Take 5 mg by mouth daily     • VITAMIN D PO Take by mouth daily after breakfast       No current facility-administered medications for this visit         ALLERGIES:   No Known Allergies    SOCIAL HISTORY:     Social History     Socioeconomic History   • Marital status: Unknown     Spouse name: None   • Number of children: None   • Years of education: None   • Highest education level: None   Occupational History   • None   Tobacco Use   • Smoking status: Former Smoker     Types: Cigarettes   • Smokeless tobacco: Never Used   • Tobacco comment: stop smoking over 30 years ago   Vaping Use   • Vaping Use: Never used   Substance and Sexual Activity   • Alcohol use: Yes     Comment: very rare   • Drug use: Never   • Sexual activity: None   Other Topics Concern   • None   Social History Narrative   • None     Social Determinants of Health     Financial Resource Strain: Not on file   Food Insecurity: Not on file   Transportation Needs: Not on file   Physical Activity: Not on file   Stress: Not on file   Social Connections: Not on file   Intimate Partner Violence: Not on file   Housing Stability: Not on file       SOCIAL HISTORY:     Family History   Problem Relation Age of Onset   • Breast cancer Mother    • Esophageal cancer Father    • Heart failure Brother        REVIEW OF SYSTEMS:     Review of Systems   Constitutional: Negative  Respiratory: Negative  Cardiovascular: Negative  Gastrointestinal: Negative  Genitourinary: Negative  Musculoskeletal: Negative  Skin: Negative  Psychiatric/Behavioral: Negative  PHYSICAL EXAM:     /70 (BP Location: Left arm, Patient Position: Sitting, Cuff Size: Adult)   Pulse 68   Ht 5' 6" (1 676 m)   Wt 78 5 kg (173 lb)   BMI 27 92 kg/m²     General:  Healthy appearing female in no acute distress  They have a normal affect  There is not appear to be any gross neurologic defects or abnormalities  HEENT:  Normocephalic, atraumatic  Neck is supple without any palpable lymphadenopathy  Cardiovascular:  Patient has normal palpable distal radial pulses  There is no significant peripheral edema  No JVD is noted  Respiratory:  Patient has unlabored respirations  There is no audible wheeze or rhonchi  Abdomen:  Abdomen is with healed surgical scars  Abdomen is soft and nontender  There is no tympany  Inguinal and umbilical hernia are not appreciated      Genitourinary:  Atrophic external female genitalia, narrowed introitus, slight urethral caruncle noted at the 6 o'clock position    Musculoskeletal:  Patient does not have significant CVA tenderness in the  flank with palpation or percussion  They full range of motion in all 4 extremities  Strength in all 4 extremities appears congruent  Patient is able to ambulate without assistance or difficulty  Dermatologic:  Patient has no skin abnormalities or rashes  LABS:     CBC: No results found for: WBC, HGB, HCT, MCV, PLT    BMP: No results found for: GLUCOSE, CALCIUM, NA, K, CO2, CL, BUN, CREATININE    Urine Culture >100,000 cfu/ml Escherichia coli Abnormal     This organism has been edited  The previous result was Gram Negative Koffi Enteric Like on 6/22/2022 at 1526 EDT  Susceptibility     Escherichia coli     CORRINA     Ampicillin ($$) <=8 00 ug/ml Susceptible     Aztreonam ($$$)  <=4 ug/ml Susceptible     Cefazolin ($) <=2 00 ug/ml Susceptible     Ciprofloxacin ($)  <=0 25 ug/ml Susceptible     Gentamicin ($$) <=2 ug/ml Susceptible     Levofloxacin ($) <=0 50 ug/ml Susceptible     Nitrofurantoin <=32 ug/ml Susceptible     Tetracycline <=4 ug/ml Susceptible     Tobramycin ($) <=2 ug/ml Susceptible     Trimethoprim + Sulfamethoxazole ($$$) <=0 5/9 5 u  Susceptible                  Specimen Collected: 06/21/22  1:25              IMAGING:     No urologic imaging    June INTRAOPERATIVE IMAGES:         PROCEDURE:     SEE NOTE    ASSESSMENT:     65 y o  female with Bladder Abnormality at time of prior UroGynecologic procedure    PLAN:     I agree that there is a slight abnormality medial to the right pedro trigone  I suspect that this is unlikely to be malignancy however urine cytology was sent today  Given the patient's history of congenitally abnormal right kidney now status post nephrectomy, I am 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

## 2022-11-03 NOTE — PROGRESS NOTES
UROLOGY NEW CONSULT NOTE     CHIEF COMPLAINT   Rayna Wilhelm is a 65 y o  female with a complaint of   Chief Complaint   Patient presents with   • Cystoscopy       History of Present Illness:     65 y o  female, known to Urogynecology (Dr Kelly Delavan)  Patient had a procedure end of June 2022 for stress incontinence at which time cystoscopy was performed by the team   Question of mucosal bladder abnormality was seen  Urology was contacted to arrange outpatient follow-up  Patient was contacted in July, there was some delay to scheduling at patient request  Patient now presents  In discussion, the patient has a 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2  She did follow with Nephrology for some time  Has not had any recent imaging of her left kidney  No gross hematuria  No history of bladder cancer  Remote smoking history now quit  Past Medical History:     Past Medical History:   Diagnosis Date   • Anxiety    • Atrial fibrillation ([address] )    • Colon polyp    • History of transfusion    • Hyperlipidemia    • Hypertension    • Irregular heart beat        PAST SURGICAL HISTORY:     Past Surgical History:   Procedure Laterality Date   • COLONOSCOPY     • CYSTOSCOPY  11/03/2022    [place]   • HAND SURGERY     • INSERT / REPLACE / REMOVE PACEMAKER     • KNEE ARTHROSCOPY Left    • ND CMBND ANTERPOST COLPORRAPHY W/CYSTO N/A 06/21/2022    Procedure: A&P COLPORRHAPHY;  Surgeon: Ailyn Borrego MD;  [facility];  Service: UroGynecology          • ND CYSTOURETHROSCOPY N/A 06/21/2022    Procedure: Marcia Carpenter;  Surgeon: Ailyn Borrego MD;  [facility];  Service: UroGynecology          • ND REVAGINAL PROLAPSE,SACROSP LIG N/A 06/21/2022    Procedure: COLPOSUSPENSION VAGINAL EXTRAPERITONEAL(ENPLACE);   Surgeon: Ailyn Borrego MD;  [facility];  Service: UroGynecology          • ND SLING OPER STRES INCONTINENCE N/A 06/21/2022 Procedure: PUBOVAGINAL SLING;  Surgeon: Shavonne Hernandez MD;  [facility];  Service: UroGynecology          • UMBILICAL HERNIA REPAIR      with mesh       CURRENT MEDICATIONS:     Current Outpatient Medications   Medication Sig Dispense Refill   • acetaminophen (TYLENOL) 650 mg CR tablet Take 1 tablet (650 mg total) by mouth every 6 (six) hours as needed for mild pain 30 tablet 0   • dabigatran etexilate (PRADAXA) 150 mg capsu Take 150 mg by mouth 2 (two) times a day     • diltiazem (DILACOR XR) 240 MG 24 hr capsule Take 240 mg by mouth daily     • docusate sodium (COLACE) 100 mg capsule Take 1 capsule (100 mg total) by mouth 2 (two) times a day  0   • fenofibrate (TRICOR) 54 MG tablet Take 54 mg by mouth daily     • metoprolol succinate (TOPROL-XL) 50 mg 24 hr tablet Take 50 mg by mouth daily     • olmesartan (BENICAR) 40 mg tablet Take 40 mg by mouth daily     • Omega-3 Fatty Acids (FISH OIL PO) Take by mouth daily after breakfast     • Premarin vaginal cream      • rosuvastatin (CRESTOR) 5 mg tablet Take 5 mg by mouth daily     • VITAMIN D PO Take by mouth daily after breakfast       No current facility-administered medications for this visit         ALLERGIES:   No Known Allergies    SOCIAL HISTORY:     Social History     Socioeconomic History   • Marital status: Unknown     Spouse name: None   • Number of children: None   • Years of education: None   • Highest education level: None   Occupational History   • None   Tobacco Use   • Smoking status: Former Smoker     Types: Cigarettes   • Smokeless tobacco: Never Used   • Tobacco comment: stop smoking over 30 years ago   Vaping Use   • Vaping Use: Never used   Substance and Sexual Activity   • Alcohol use: Yes     Comment: very rare   • Drug use: Never   • Sexual activity: None   Other Topics Concern   • None   Social History Narrative   • None     Social Determinants of Health     Financial Resource Strain: Not on file   Food Insecurity: Not on file Transportation Needs: Not on file   Physical Activity: Not on file   Stress: Not on file   Social Connections: Not on file   Intimate Partner Violence: Not on file   Housing Stability: Not on file       SOCIAL HISTORY:     Family History   Problem Relation Age of Onset   • Breast cancer Mother    • Esophageal cancer Father    • Heart failure Brother        REVIEW OF SYSTEMS:     Review of Systems   Constitutional: Negative  Respiratory: Negative  Cardiovascular: Negative  Gastrointestinal: Negative  Genitourinary: Negative  Musculoskeletal: Negative  Skin: Negative  Psychiatric/Behavioral: Negative  PHYSICAL EXAM:     /70 (BP Location: Left arm, Patient Position: Sitting, Cuff Size: Adult)   Pulse 68   Ht 5' 6" (1 676 m)   Wt 78 5 kg (173 lb)   BMI 27 92 kg/m²     General:  Healthy appearing female in no acute distress  They have a normal affect  There is not appear to be any gross neurologic defects or abnormalities  HEENT:  Normocephalic, atraumatic  Neck is supple without any palpable lymphadenopathy  Cardiovascular:  Patient has normal palpable distal radial pulses  There is no significant peripheral edema  No JVD is noted  Respiratory:  Patient has unlabored respirations  There is no audible wheeze or rhonchi  Abdomen:  Abdomen is with healed surgical scars  Abdomen is soft and nontender  There is no tympany  Inguinal and umbilical hernia are not appreciated  Genitourinary:  Atrophic external female genitalia, narrowed introitus, slight urethral caruncle noted at the 6 o'clock position    Musculoskeletal:  Patient does not have significant CVA tenderness in the  flank with palpation or percussion  They full range of motion in all 4 extremities  Strength in all 4 extremities appears congruent  Patient is able to ambulate without assistance or difficulty  Dermatologic:  Patient has no skin abnormalities or rashes        LABS:     CBC: No results found for: WBC, HGB, HCT, MCV, PLT    BMP: No results found for: GLUCOSE, CALCIUM, NA, K, CO2, CL, BUN, CREATININE    Urine Culture >100,000 cfu/ml Escherichia coli Abnormal     This organism has been edited  The previous result was Gram Negative Koffi Enteric Like on 6/22/2022 at 1526 EDT  Susceptibility     Escherichia coli     CORRINA     Ampicillin ($$) <=8 00 ug/ml Susceptible     Aztreonam ($$$)  <=4 ug/ml Susceptible     Cefazolin ($) <=2 00 ug/ml Susceptible     Ciprofloxacin ($)  <=0 25 ug/ml Susceptible     Gentamicin ($$) <=2 ug/ml Susceptible     Levofloxacin ($) <=0 50 ug/ml Susceptible     Nitrofurantoin <=32 ug/ml Susceptible     Tetracycline <=4 ug/ml Susceptible     Tobramycin ($) <=2 ug/ml Susceptible     Trimethoprim + Sulfamethoxazole ($$$) <=0 5/9 5 u  Susceptible                  Specimen Collected: 06/21/22  1:25              IMAGING:     No urologic imaging    June INTRAOPERATIVE IMAGES:         PROCEDURE:     SEE NOTE    ASSESSMENT:     65 y o  female with Bladder Abnormality at time of prior UroGynecologic procedure    PLAN:     I agree that there is a slight abnormality medial to the right pedro trigone  I suspect that this is unlikely to be malignancy however urine cytology was sent today  Given the patient's history of congenitally abnormal right kidney now status post nephrectomy, I am 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

## 2022-11-09 ENCOUNTER — TELEPHONE (OUTPATIENT)
Dept: UROLOGY | Facility: CLINIC | Age: 77
End: 2022-11-09

## 2022-11-09 NOTE — TELEPHONE ENCOUNTER
Called and spoke with patient to schedule surgery  Patient would like to wait until after the holidays and asked that I contact her closer to that time  Holding 1/25/23 at [facility] with Dr Evangelista Titus

## 2023-01-03 ENCOUNTER — TELEPHONE (OUTPATIENT)
Dept: UROLOGY | Facility: AMBULATORY SURGERY CENTER | Age: 78
End: 2023-01-03

## 2023-01-03 NOTE — TELEPHONE ENCOUNTER
Pt under care of Dr Jaida Pike from [address] [place] [address] called and left VM asking for US renal order to be faxed [phone]    Call [phone] [facility]

## 2023-01-04 NOTE — TELEPHONE ENCOUNTER
Pt called and got the script faxed to [id number] [facility] that pt will be having done on 1/10/23  Pt also stated she had blood work and urine testing completed today and results will be sent to our office and pt PCP office   Pt stated she recently had an EKG with her cardiologist Dr Irvin Manzano and to ask for Diana to assist with getting the results [phone]    Pt call [phone]

## 2023-01-09 DIAGNOSIS — N32.9 LESION OF BLADDER: Primary | ICD-10-CM

## 2023-01-09 RX ORDER — CEFUROXIME AXETIL 250 MG/1
250 TABLET ORAL EVERY 12 HOURS SCHEDULED
Qty: 12 TABLET | Refills: 0 | Status: SHIPPED | OUTPATIENT
Start: 2023-01-09 | End: 2023-01-15

## 2023-01-13 NOTE — TELEPHONE ENCOUNTER
Right kidney absent (nx age 2)  Left kidney no hydro/mass, simple cysts  Posterior bladder thickening focally, without mass/stone  Planned TURBT/bbx plan remains the same

## 2023-01-13 NOTE — TELEPHONE ENCOUNTER
Triston Solis from [facility] radiology calling that she faxed the [address] results and there were unexpected or indeterminate findings

## 2023-01-25 ENCOUNTER — TELEPHONE (OUTPATIENT)
Dept: OTHER | Facility: HOSPITAL | Age: 78
End: 2023-01-25

## 2023-01-25 DIAGNOSIS — N30.00 ACUTE CYSTITIS WITHOUT HEMATURIA: Primary | ICD-10-CM

## 2023-01-25 RX ORDER — NITROFURANTOIN 25; 75 MG/1; MG/1
100 CAPSULE ORAL 2 TIMES DAILY
Qty: 28 CAPSULE | Refills: 0 | Status: SHIPPED | OUTPATIENT
Start: 2023-01-25 | End: 2023-02-08

## 2023-01-26 NOTE — TELEPHONE ENCOUNTER
Called and spoke with patient  Informed patient that macrobid has been called in and she is to start taking it

## 2023-01-27 NOTE — TELEPHONE ENCOUNTER
Per patient's phone call, she would like to know if she is to take both antibiotics that she has been prescribed

## 2023-01-27 NOTE — TELEPHONE ENCOUNTER
Called and left detailed VM for patient with recommendation to take Macrobid as ordered by Dr Yepez Sax

## 2023-02-05 ENCOUNTER — TELEPHONE (OUTPATIENT)
Dept: OTHER | Facility: OTHER | Age: 78
End: 2023-02-05

## 2023-02-05 NOTE — TELEPHONE ENCOUNTER
PT 's daughter Jani Ewing called in to notify PT tested positive for covid and unable to come to procedure appointment /26 in morning with Dr Bertram Calvillo  Operating physician and hospital supervisor both paged via tiger connect to make aware of cancellation

## 2023-02-07 ENCOUNTER — PREP FOR PROCEDURE (OUTPATIENT)
Dept: UROLOGY | Facility: CLINIC | Age: 78
End: 2023-02-07

## 2023-02-07 DIAGNOSIS — N32.9 LESION OF BLADDER: Primary | ICD-10-CM

## 2023-02-08 NOTE — TELEPHONE ENCOUNTER
Attempted to call patient to reschedule surgery, patient does not accept blocked calls  Will try again tomorrow while in office

## 2023-02-08 NOTE — TELEPHONE ENCOUNTER
Pt called to reschedule procedure that was cxl due to have + for covid       Pot can be reached at [phone]

## 2023-02-09 NOTE — TELEPHONE ENCOUNTER
Called and spoke with patient  Patient does not want to wait until April to have procedure done  Patient questioning if she can have done in office  Please advise

## 2023-02-09 NOTE — TELEPHONE ENCOUNTER
Called spoke with patient  Patient scheduled 04/12/2023 at [facility] CLOS[facility] with Dr Conner West  Verbally wentr over instructions with patient:    -nothing to eat or drink after midnight the night prior  -patient will need a  too and from   -hospital will call with arrival time the day prior between 3-6pm    -stop aspirin and vitamins 7 days prior (med hold form sent to PCP for pradaxa)  -urine culture and blood work to be done 2 weeks prior  [facility] tracker updated 02/09/2023  Surgical packet mailed 02/09/2023

## 2023-03-21 DIAGNOSIS — N32.9 LESION OF BLADDER: Primary | ICD-10-CM

## 2023-03-24 LAB
APTT PPP: 38 SEC (ref 24–33)
BACTERIA UR CULT: ABNORMAL
BASOPHILS # BLD AUTO: 0.1 X10E3/UL (ref 0–0.2)
BASOPHILS NFR BLD AUTO: 1 %
BUN SERPL-MCNC: 27 MG/DL (ref 8–27)
BUN/CREAT SERPL: 24 (ref 12–28)
CALCIUM SERPL-MCNC: 9.7 MG/DL (ref 8.7–10.3)
CHLORIDE SERPL-SCNC: 108 MMOL/L (ref 96–106)
CO2 SERPL-SCNC: 21 MMOL/L (ref 20–29)
CREAT SERPL-MCNC: 1.11 MG/DL (ref 0.57–1)
EGFR: 51 ML/MIN/1.73
EOSINOPHIL # BLD AUTO: 0.2 X10E3/UL (ref 0–0.4)
EOSINOPHIL NFR BLD AUTO: 3 %
ERYTHROCYTE [DISTWIDTH] IN BLOOD BY AUTOMATED COUNT: 12.4 % (ref 11.7–15.4)
GLUCOSE SERPL-MCNC: 99 MG/DL (ref 70–99)
HCT VFR BLD AUTO: 37.7 % (ref 34–46.6)
HGB BLD-MCNC: 12.4 G/DL (ref 11.1–15.9)
IMM GRANULOCYTES # BLD: 0 X10E3/UL (ref 0–0.1)
IMM GRANULOCYTES NFR BLD: 0 %
INR PPP: 1.2 (ref 0.9–1.2)
LYMPHOCYTES # BLD AUTO: 2.1 X10E3/UL (ref 0.7–3.1)
LYMPHOCYTES NFR BLD AUTO: 40 %
Lab: ABNORMAL
MCH RBC QN AUTO: 30.2 PG (ref 26.6–33)
MCHC RBC AUTO-ENTMCNC: 32.9 G/DL (ref 31.5–35.7)
MCV RBC AUTO: 92 FL (ref 79–97)
MONOCYTES # BLD AUTO: 0.4 X10E3/UL (ref 0.1–0.9)
MONOCYTES NFR BLD AUTO: 8 %
NEUTROPHILS # BLD AUTO: 2.5 X10E3/UL (ref 1.4–7)
NEUTROPHILS NFR BLD AUTO: 48 %
PLATELET # BLD AUTO: 243 X10E3/UL (ref 150–450)
POTASSIUM SERPL-SCNC: 4.5 MMOL/L (ref 3.5–5.2)
PROTHROMBIN TIME: 12.4 SEC (ref 9.1–12)
RBC # BLD AUTO: 4.1 X10E6/UL (ref 3.77–5.28)
SL AMB ANTIMICROBIAL SUSCEPTIBILITY: ABNORMAL
SODIUM SERPL-SCNC: 143 MMOL/L (ref 134–144)
WBC # BLD AUTO: 5.2 X10E3/UL (ref 3.4–10.8)

## 2023-03-27 ENCOUNTER — TELEPHONE (OUTPATIENT)
Dept: UROLOGY | Facility: CLINIC | Age: 78
End: 2023-03-27

## 2023-03-27 DIAGNOSIS — N32.9 LESION OF BLADDER: ICD-10-CM

## 2023-03-27 RX ORDER — CEFUROXIME AXETIL 250 MG/1
250 TABLET ORAL EVERY 12 HOURS SCHEDULED
Qty: 28 TABLET | Refills: 0 | Status: SHIPPED | OUTPATIENT
Start: 2023-03-27 | End: 2023-03-28 | Stop reason: SDUPTHER

## 2023-03-27 NOTE — PROGRESS NOTES
Telephone call to patient  Left generic message for pt to call the office back at [phone]  There is no communication sheet on file

## 2023-03-27 NOTE — TELEPHONE ENCOUNTER
Evert Forbes MD   3/27/2023 11:42 AM EDT Back to Top      Please start patient on low dose BID ceftin in advanced of OR  Urine culture positive  Telephone call to patient  Left generic message for pt to call the office back at [phone]  There is no communication sheet on file

## 2023-03-28 RX ORDER — CEFUROXIME AXETIL 250 MG/1
250 TABLET ORAL EVERY 12 HOURS SCHEDULED
Qty: 28 TABLET | Refills: 0 | Status: SHIPPED | OUTPATIENT
Start: 2023-03-28 | End: 2023-04-11

## 2023-03-28 NOTE — TELEPHONE ENCOUNTER
Telephone call to patient  Left generic message for pt to call the office back at [phone]  There is no communication sheet on file

## 2023-03-28 NOTE — TELEPHONE ENCOUNTER
rec'd telephone call from pt  Reviewed results  Pt would like rx to go to [facility] in [facility] adjusted in the computer

## 2023-04-27 ENCOUNTER — TELEMEDICINE (OUTPATIENT)
Dept: UROLOGY | Facility: CLINIC | Age: 78
End: 2023-04-27

## 2023-04-27 DIAGNOSIS — N32.9 LESION OF BLADDER: Primary | ICD-10-CM

## 2023-04-27 NOTE — PROGRESS NOTES
Virtual Brief Visit    This Visit is being completed by telephone  The Patient is located at Home and in the following state in which I hold an active license [place]    The patient was identified by name and date of birth  Romario Lantigua was informed that this is a telemedicine visit and that the visit is being conducted through Telephone  My office door was closed  No one else was in the room  She acknowledged consent and understanding of privacy and security of the video platform  The patient has agreed to participate and understands they can discontinue the visit at any time  Patient is aware this is a billable service  Assessment/Plan: Patient is a 76-year-old female with a 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 at the time of her urogynecologic procedure  This was confirmed on office cystoscopy  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  I suspected this area was not papillary urothelial carcinoma but rather remanent of her embryologic abnormality  Office biopsy could not be performed given the patient's use of antiplatelet therapy  Patient recently underwent bladder biopsy and fortunately pathology returns benign  Patient has already been given the news by our office staff and today's visit was to confirm the patient is doing well after the procedure  She has minimal irritation and bleeding  She is thrilled with the results  At this point time, I have offered the patient as needed follow-up moving forward  She was very grateful for the urologic care and will contact me with any future needs  Problem List Items Addressed This Visit    None      Recent Visits  No visits were found meeting these conditions  Showing recent visits within past 7 days and meeting all other requirements  Future Appointments  No visits were found meeting these conditions    Showing future appointments within next 150 days and meeting all other requirements         Visit Time  Total Visit Duration: 15
